# Patient Record
Sex: FEMALE | URBAN - METROPOLITAN AREA
[De-identification: names, ages, dates, MRNs, and addresses within clinical notes are randomized per-mention and may not be internally consistent; named-entity substitution may affect disease eponyms.]

---

## 2021-12-16 ENCOUNTER — LAB REQUISITION (OUTPATIENT)
Dept: LAB | Facility: CLINIC | Age: 81
End: 2021-12-16
Payer: COMMERCIAL

## 2021-12-16 DIAGNOSIS — A15.0 TUBERCULOSIS OF LUNG: ICD-10-CM

## 2021-12-17 PROCEDURE — 36415 COLL VENOUS BLD VENIPUNCTURE: CPT | Mod: ORL | Performed by: INTERNAL MEDICINE

## 2021-12-17 PROCEDURE — P9603 ONE-WAY ALLOW PRORATED MILES: HCPCS | Mod: ORL | Performed by: INTERNAL MEDICINE

## 2021-12-17 PROCEDURE — 86481 TB AG RESPONSE T-CELL SUSP: CPT | Mod: ORL | Performed by: INTERNAL MEDICINE

## 2021-12-18 LAB
GAMMA INTERFERON BACKGROUND BLD IA-ACNC: 0.06 IU/ML
M TB IFN-G BLD-IMP: NEGATIVE
M TB IFN-G CD4+ BCKGRND COR BLD-ACNC: 9.94 IU/ML
MITOGEN IGNF BCKGRD COR BLD-ACNC: 0.01 IU/ML
MITOGEN IGNF BCKGRD COR BLD-ACNC: 0.06 IU/ML
QUANTIFERON MITOGEN: 10 IU/ML
QUANTIFERON NIL TUBE: 0.06 IU/ML
QUANTIFERON TB1 TUBE: 0.12 IU/ML
QUANTIFERON TB2 TUBE: 0.07

## 2022-03-31 ENCOUNTER — LAB REQUISITION (OUTPATIENT)
Dept: LAB | Facility: CLINIC | Age: 82
End: 2022-03-31
Payer: COMMERCIAL

## 2022-03-31 DIAGNOSIS — R53.83 OTHER FATIGUE: ICD-10-CM

## 2022-04-01 ENCOUNTER — LAB REQUISITION (OUTPATIENT)
Dept: LAB | Facility: CLINIC | Age: 82
End: 2022-04-01
Payer: COMMERCIAL

## 2022-04-01 DIAGNOSIS — R53.83 OTHER FATIGUE: ICD-10-CM

## 2022-04-01 DIAGNOSIS — G20.A1 PARKINSON'S DISEASE (H): ICD-10-CM

## 2022-04-03 ENCOUNTER — LAB REQUISITION (OUTPATIENT)
Dept: LAB | Facility: CLINIC | Age: 82
End: 2022-04-03
Payer: COMMERCIAL

## 2022-04-03 DIAGNOSIS — G20.A1 PARKINSON'S DISEASE (H): ICD-10-CM

## 2022-04-04 LAB
ALBUMIN SERPL-MCNC: 4 G/DL (ref 3.5–5)
ALP SERPL-CCNC: 137 U/L (ref 45–120)
ALT SERPL W P-5'-P-CCNC: 9 U/L (ref 0–45)
ANION GAP SERPL CALCULATED.3IONS-SCNC: 10 MMOL/L (ref 5–18)
AST SERPL W P-5'-P-CCNC: 36 U/L (ref 0–40)
BILIRUB DIRECT SERPL-MCNC: 0.1 MG/DL
BILIRUB SERPL-MCNC: 0.4 MG/DL (ref 0–1)
BUN SERPL-MCNC: 23 MG/DL (ref 8–28)
CALCIUM SERPL-MCNC: 10.2 MG/DL (ref 8.5–10.5)
CHLORIDE BLD-SCNC: 102 MMOL/L (ref 98–107)
CO2 SERPL-SCNC: 30 MMOL/L (ref 22–31)
CREAT SERPL-MCNC: 0.81 MG/DL (ref 0.6–1.1)
ERYTHROCYTE [DISTWIDTH] IN BLOOD BY AUTOMATED COUNT: 14.5 % (ref 10–15)
GFR SERPL CREATININE-BSD FRML MDRD: 73 ML/MIN/1.73M2
GLUCOSE BLD-MCNC: 83 MG/DL (ref 70–125)
HCT VFR BLD AUTO: 40.3 % (ref 35–47)
HGB BLD-MCNC: 12.7 G/DL (ref 11.7–15.7)
MCH RBC QN AUTO: 29.4 PG (ref 26.5–33)
MCHC RBC AUTO-ENTMCNC: 31.5 G/DL (ref 31.5–36.5)
MCV RBC AUTO: 93 FL (ref 78–100)
PLATELET # BLD AUTO: 206 10E3/UL (ref 150–450)
POTASSIUM BLD-SCNC: 4.7 MMOL/L (ref 3.5–5)
PROT SERPL-MCNC: 7.4 G/DL (ref 6–8)
RBC # BLD AUTO: 4.32 10E6/UL (ref 3.8–5.2)
SODIUM SERPL-SCNC: 142 MMOL/L (ref 136–145)
WBC # BLD AUTO: 3.4 10E3/UL (ref 4–11)

## 2022-04-04 PROCEDURE — 80053 COMPREHEN METABOLIC PANEL: CPT | Mod: ORL | Performed by: INTERNAL MEDICINE

## 2022-04-04 PROCEDURE — 82248 BILIRUBIN DIRECT: CPT | Mod: ORL | Performed by: INTERNAL MEDICINE

## 2022-04-04 PROCEDURE — P9603 ONE-WAY ALLOW PRORATED MILES: HCPCS | Mod: ORL | Performed by: INTERNAL MEDICINE

## 2022-04-04 PROCEDURE — 85027 COMPLETE CBC AUTOMATED: CPT | Mod: ORL | Performed by: INTERNAL MEDICINE

## 2022-04-04 PROCEDURE — 36415 COLL VENOUS BLD VENIPUNCTURE: CPT | Mod: ORL | Performed by: INTERNAL MEDICINE

## 2022-12-07 ENCOUNTER — MEDICAL CORRESPONDENCE (OUTPATIENT)
Dept: HEALTH INFORMATION MANAGEMENT | Facility: CLINIC | Age: 82
End: 2022-12-07

## 2022-12-07 ENCOUNTER — TRANSFERRED RECORDS (OUTPATIENT)
Dept: HEALTH INFORMATION MANAGEMENT | Facility: CLINIC | Age: 82
End: 2022-12-07

## 2022-12-08 ENCOUNTER — LAB REQUISITION (OUTPATIENT)
Dept: LAB | Facility: CLINIC | Age: 82
End: 2022-12-08
Payer: COMMERCIAL

## 2022-12-08 DIAGNOSIS — E03.9 HYPOTHYROIDISM, UNSPECIFIED: ICD-10-CM

## 2022-12-09 LAB
ANION GAP SERPL CALCULATED.3IONS-SCNC: 11 MMOL/L (ref 7–15)
BUN SERPL-MCNC: 18.2 MG/DL (ref 8–23)
CALCIUM SERPL-MCNC: 9.2 MG/DL (ref 8.8–10.2)
CHLORIDE SERPL-SCNC: 105 MMOL/L (ref 98–107)
CREAT SERPL-MCNC: 0.9 MG/DL (ref 0.51–0.95)
DEPRECATED HCO3 PLAS-SCNC: 27 MMOL/L (ref 22–29)
GFR SERPL CREATININE-BSD FRML MDRD: 64 ML/MIN/1.73M2
GLUCOSE SERPL-MCNC: 84 MG/DL (ref 70–99)
POTASSIUM SERPL-SCNC: 4.1 MMOL/L (ref 3.4–5.3)
SODIUM SERPL-SCNC: 143 MMOL/L (ref 136–145)
TSH SERPL DL<=0.005 MIU/L-ACNC: 7.04 UIU/ML (ref 0.3–4.2)

## 2022-12-09 PROCEDURE — 80048 BASIC METABOLIC PNL TOTAL CA: CPT | Mod: ORL | Performed by: INTERNAL MEDICINE

## 2022-12-09 PROCEDURE — 84443 ASSAY THYROID STIM HORMONE: CPT | Mod: ORL | Performed by: INTERNAL MEDICINE

## 2022-12-09 PROCEDURE — P9603 ONE-WAY ALLOW PRORATED MILES: HCPCS | Mod: ORL | Performed by: INTERNAL MEDICINE

## 2022-12-09 PROCEDURE — 36415 COLL VENOUS BLD VENIPUNCTURE: CPT | Mod: ORL | Performed by: INTERNAL MEDICINE

## 2023-01-19 ENCOUNTER — OFFICE VISIT (OUTPATIENT)
Dept: FAMILY MEDICINE | Facility: OTHER | Age: 83
End: 2023-01-19
Payer: COMMERCIAL

## 2023-01-19 VITALS
TEMPERATURE: 97.1 F | SYSTOLIC BLOOD PRESSURE: 122 MMHG | OXYGEN SATURATION: 100 % | HEART RATE: 57 BPM | HEIGHT: 66 IN | DIASTOLIC BLOOD PRESSURE: 78 MMHG

## 2023-01-19 DIAGNOSIS — F32.A ANXIETY AND DEPRESSION: ICD-10-CM

## 2023-01-19 DIAGNOSIS — G62.9 PERIPHERAL POLYNEUROPATHY: ICD-10-CM

## 2023-01-19 DIAGNOSIS — Z00.00 MEDICARE ANNUAL WELLNESS VISIT, SUBSEQUENT: Primary | ICD-10-CM

## 2023-01-19 DIAGNOSIS — H35.3290 EXUDATIVE AGE-RELATED MACULAR DEGENERATION, UNSPECIFIED LATERALITY, UNSPECIFIED STAGE (H): ICD-10-CM

## 2023-01-19 DIAGNOSIS — K21.9 GASTROESOPHAGEAL REFLUX DISEASE, UNSPECIFIED WHETHER ESOPHAGITIS PRESENT: ICD-10-CM

## 2023-01-19 DIAGNOSIS — D64.9 MILD CHRONIC ANEMIA: ICD-10-CM

## 2023-01-19 DIAGNOSIS — E03.9 HYPOTHYROIDISM, UNSPECIFIED TYPE: ICD-10-CM

## 2023-01-19 DIAGNOSIS — R73.01 FASTING HYPERGLYCEMIA: ICD-10-CM

## 2023-01-19 DIAGNOSIS — G20.A1 PARKINSON'S DISEASE (H): ICD-10-CM

## 2023-01-19 DIAGNOSIS — F41.9 ANXIETY AND DEPRESSION: ICD-10-CM

## 2023-01-19 DIAGNOSIS — M51.369 DDD (DEGENERATIVE DISC DISEASE), LUMBAR: ICD-10-CM

## 2023-01-19 DIAGNOSIS — H52.00 HYPERMETROPIA, UNSPECIFIED LATERALITY: ICD-10-CM

## 2023-01-19 DIAGNOSIS — K58.9 IRRITABLE BOWEL SYNDROME, UNSPECIFIED TYPE: ICD-10-CM

## 2023-01-19 DIAGNOSIS — R01.1 HEART MURMUR: ICD-10-CM

## 2023-01-19 DIAGNOSIS — E78.2 MIXED HYPERLIPIDEMIA: ICD-10-CM

## 2023-01-19 DIAGNOSIS — H26.491 RIGHT POSTERIOR CAPSULAR OPACIFICATION: ICD-10-CM

## 2023-01-19 PROBLEM — R51.9 HEADACHE: Status: ACTIVE | Noted: 2018-08-15

## 2023-01-19 PROBLEM — H53.2 DIPLOPIA: Status: ACTIVE | Noted: 2021-10-19

## 2023-01-19 LAB
ANION GAP SERPL CALCULATED.3IONS-SCNC: 9 MMOL/L (ref 7–15)
BUN SERPL-MCNC: 23.4 MG/DL (ref 8–23)
CALCIUM SERPL-MCNC: 9.9 MG/DL (ref 8.8–10.2)
CHLORIDE SERPL-SCNC: 103 MMOL/L (ref 98–107)
CREAT SERPL-MCNC: 0.84 MG/DL (ref 0.51–0.95)
DEPRECATED HCO3 PLAS-SCNC: 30 MMOL/L (ref 22–29)
ERYTHROCYTE [DISTWIDTH] IN BLOOD BY AUTOMATED COUNT: 16 % (ref 10–15)
GFR SERPL CREATININE-BSD FRML MDRD: 69 ML/MIN/1.73M2
GLUCOSE SERPL-MCNC: 91 MG/DL (ref 70–99)
HCT VFR BLD AUTO: 34.4 % (ref 35–47)
HGB BLD-MCNC: 10.9 G/DL (ref 11.7–15.7)
MCH RBC QN AUTO: 29.4 PG (ref 26.5–33)
MCHC RBC AUTO-ENTMCNC: 31.7 G/DL (ref 31.5–36.5)
MCV RBC AUTO: 93 FL (ref 78–100)
PLATELET # BLD AUTO: 193 10E3/UL (ref 150–450)
POTASSIUM SERPL-SCNC: 4 MMOL/L (ref 3.4–5.3)
RBC # BLD AUTO: 3.71 10E6/UL (ref 3.8–5.2)
SODIUM SERPL-SCNC: 142 MMOL/L (ref 136–145)
TSH SERPL DL<=0.005 MIU/L-ACNC: 0.91 UIU/ML (ref 0.3–4.2)
WBC # BLD AUTO: 6.9 10E3/UL (ref 4–11)

## 2023-01-19 PROCEDURE — 84443 ASSAY THYROID STIM HORMONE: CPT | Performed by: PHYSICIAN ASSISTANT

## 2023-01-19 PROCEDURE — 85027 COMPLETE CBC AUTOMATED: CPT | Performed by: PHYSICIAN ASSISTANT

## 2023-01-19 PROCEDURE — 36415 COLL VENOUS BLD VENIPUNCTURE: CPT | Performed by: PHYSICIAN ASSISTANT

## 2023-01-19 PROCEDURE — 99214 OFFICE O/P EST MOD 30 MIN: CPT | Mod: 25 | Performed by: PHYSICIAN ASSISTANT

## 2023-01-19 PROCEDURE — G0438 PPPS, INITIAL VISIT: HCPCS | Performed by: PHYSICIAN ASSISTANT

## 2023-01-19 PROCEDURE — 80048 BASIC METABOLIC PNL TOTAL CA: CPT | Performed by: PHYSICIAN ASSISTANT

## 2023-01-19 RX ORDER — CARBOXYMETHYLCELLULOSE SODIUM 5 MG/ML
1 SOLUTION/ DROPS OPHTHALMIC 3 TIMES DAILY PRN
Qty: 30 ML | Refills: 1 | COMMUNITY
Start: 2023-01-19

## 2023-01-19 RX ORDER — VITAMIN B COMPLEX
1 TABLET ORAL DAILY
COMMUNITY
Start: 2023-01-19

## 2023-01-19 RX ORDER — MEMANTINE HYDROCHLORIDE 10 MG/1
10 TABLET ORAL 2 TIMES DAILY
Qty: 60 TABLET | Refills: 2 | Status: SHIPPED | OUTPATIENT
Start: 2023-01-19

## 2023-01-19 RX ORDER — ANTIOX #8/OM3/DHA/EPA/LUT/ZEAX 250-2.5 MG
1 CAPSULE ORAL 2 TIMES DAILY
COMMUNITY
Start: 2023-01-19

## 2023-01-19 RX ORDER — CARBIDOPA AND LEVODOPA 50; 200 MG/1; MG/1
1 TABLET, EXTENDED RELEASE ORAL AT BEDTIME
Qty: 30 TABLET | Refills: 2 | Status: SHIPPED | OUTPATIENT
Start: 2023-01-19 | End: 2023-01-19

## 2023-01-19 RX ORDER — CHOLECALCIFEROL (VITAMIN D3) 125 MCG
3000 CAPSULE ORAL
COMMUNITY
Start: 2023-01-19

## 2023-01-19 RX ORDER — PANTOPRAZOLE SODIUM 40 MG/1
40 TABLET, DELAYED RELEASE ORAL DAILY
Qty: 30 TABLET | Refills: 2 | Status: SHIPPED | OUTPATIENT
Start: 2023-01-19

## 2023-01-19 RX ORDER — SIMVASTATIN 20 MG
20 TABLET ORAL AT BEDTIME
Qty: 30 TABLET | Refills: 2 | Status: SHIPPED | OUTPATIENT
Start: 2023-01-19

## 2023-01-19 RX ORDER — GABAPENTIN 100 MG/1
CAPSULE ORAL
COMMUNITY
Start: 2021-04-28 | End: 2023-01-19

## 2023-01-19 RX ORDER — CARBIDOPA AND LEVODOPA 25; 100 MG/1; MG/1
1 TABLET ORAL 3 TIMES DAILY
COMMUNITY
Start: 2021-03-24 | End: 2023-01-19

## 2023-01-19 RX ORDER — CARBIDOPA AND LEVODOPA 25; 100 MG/1; MG/1
1 TABLET ORAL 3 TIMES DAILY
Qty: 90 TABLET | Refills: 2 | Status: SHIPPED | OUTPATIENT
Start: 2023-01-19 | End: 2023-01-19

## 2023-01-19 RX ORDER — ACETAMINOPHEN 500 MG
500-1000 TABLET ORAL EVERY 6 HOURS PRN
COMMUNITY
Start: 2023-01-19

## 2023-01-19 RX ORDER — POLYETHYLENE GLYCOL 3350 17 G/17G
17 POWDER, FOR SOLUTION ORAL DAILY
Qty: 510 G | Refills: 1 | COMMUNITY
Start: 2023-01-19

## 2023-01-19 RX ORDER — GABAPENTIN 100 MG/1
CAPSULE ORAL
Qty: 210 CAPSULE | Refills: 2 | Status: SHIPPED | OUTPATIENT
Start: 2023-01-19 | End: 2023-01-19

## 2023-01-19 RX ORDER — CHLORHEXIDINE GLUCONATE ORAL RINSE 1.2 MG/ML
15 SOLUTION DENTAL 2 TIMES DAILY
Qty: 473 ML | Refills: 2 | Status: SHIPPED | OUTPATIENT
Start: 2023-01-19

## 2023-01-19 RX ORDER — CARBIDOPA AND LEVODOPA 50; 200 MG/1; MG/1
2 TABLET, EXTENDED RELEASE ORAL AT BEDTIME
Qty: 60 TABLET | Refills: 2 | Status: SHIPPED | OUTPATIENT
Start: 2023-01-19

## 2023-01-19 RX ORDER — LEVOTHYROXINE SODIUM 112 UG/1
112 TABLET ORAL DAILY
COMMUNITY
Start: 2023-01-19

## 2023-01-19 RX ORDER — DONEPEZIL HYDROCHLORIDE 10 MG/1
10 TABLET, FILM COATED ORAL AT BEDTIME
Qty: 30 TABLET | Refills: 2 | Status: SHIPPED | OUTPATIENT
Start: 2023-01-19

## 2023-01-19 RX ORDER — CLONAZEPAM 0.5 MG/1
0.5 TABLET ORAL AT BEDTIME
Qty: 30 TABLET | Refills: 1 | Status: SHIPPED | OUTPATIENT
Start: 2023-01-19

## 2023-01-19 RX ORDER — CARBIDOPA AND LEVODOPA 25; 100 MG/1; MG/1
2 TABLET ORAL 3 TIMES DAILY
Qty: 180 TABLET | Refills: 2 | Status: SHIPPED | OUTPATIENT
Start: 2023-01-19

## 2023-01-19 RX ORDER — GABAPENTIN 100 MG/1
CAPSULE ORAL
Qty: 105 CAPSULE | Refills: 0 | Status: SHIPPED | OUTPATIENT
Start: 2023-01-19

## 2023-01-19 ASSESSMENT — ACTIVITIES OF DAILY LIVING (ADL)
CURRENT_FUNCTION: MEDICATION ADMINISTRATION REQUIRES ASSISTANCE
CURRENT_FUNCTION: MONEY MANAGEMENT REQUIRES ASSISTANCE
CURRENT_FUNCTION: SHOPPING REQUIRES ASSISTANCE
CURRENT_FUNCTION: HOUSEWORK REQUIRES ASSISTANCE
CURRENT_FUNCTION: BATHING REQUIRES ASSISTANCE
CURRENT_FUNCTION: LAUNDRY REQUIRES ASSISTANCE
CURRENT_FUNCTION: TELEPHONE REQUIRES ASSISTANCE
CURRENT_FUNCTION: TRANSPORTATION REQUIRES ASSISTANCE
CURRENT_FUNCTION: PREPARING MEALS REQUIRES ASSISTANCE

## 2023-01-19 NOTE — LETTER
January 20, 2023      Joselyn Sahu  [address] [facility] [address]            Dear ,    We are writing to inform you of your test results.    Your hemoglobin is slightly lower than normal at 10.9 but likely stable given your chronic anemia.     Your kidney function, glucose, electrolytes and thyroid tests are all normal. Thanks!        Resulted Orders   CBC with platelets   Result Value Ref Range    WBC Count 6.9 4.0 - 11.0 10e3/uL    RBC Count 3.71 (L) 3.80 - 5.20 10e6/uL    Hemoglobin 10.9 (L) 11.7 - 15.7 g/dL    Hematocrit 34.4 (L) 35.0 - 47.0 %    MCV 93 78 - 100 fL    MCH 29.4 26.5 - 33.0 pg    MCHC 31.7 31.5 - 36.5 g/dL    RDW 16.0 (H) 10.0 - 15.0 %    Platelet Count 193 150 - 450 10e3/uL   TSH with free T4 reflex   Result Value Ref Range    TSH 0.91 0.30 - 4.20 uIU/mL   Basic metabolic panel  (Ca, Cl, CO2, Creat, Gluc, K, Na, BUN)   Result Value Ref Range    Sodium 142 136 - 145 mmol/L    Potassium 4.0 3.4 - 5.3 mmol/L    Chloride 103 98 - 107 mmol/L    Carbon Dioxide (CO2) 30 (H) 22 - 29 mmol/L    Anion Gap 9 7 - 15 mmol/L    Urea Nitrogen 23.4 (H) 8.0 - 23.0 mg/dL    Creatinine 0.84 0.51 - 0.95 mg/dL    Calcium 9.9 8.8 - 10.2 mg/dL    Glucose 91 70 - 99 mg/dL    GFR Estimate 69 >60 mL/min/1.73m2      Comment:      Effective December 21, 2021 eGFRcr in adults is calculated using the 2021 CKD-EPI creatinine equation which includes age and gender (Eb et al., NEJM, DOI: 10.1056/YHQWux5140658)       If you have any questions or concerns, please call the clinic at the number listed above.       Sincerely,      Kash Hoyt PA-C

## 2023-01-19 NOTE — PROGRESS NOTES
SUBJECTIVE:   Joselyn is a 82 year old who presents for Preventive Visit.  Patient has been advised of split billing requirements and indicates understanding: Yes  Are you in the first 12 months of your Medicare coverage?  No    Healthy Habits:     In general, how would you rate your overall health?  Fair    Frequency of exercise:  None    Duration of exercise:  Less than 15 minutes    Taking medications regularly:  1    Barriers to taking medications:  Side effects    Ability to successfully perform activities of daily living:  Housework requires assistance, bathing requires assistance, telephone requires assistance, transportation requires assistance, medication administration requires assistance, preparing meals requires assistance, shopping requires assistance, laundry requires assistance and money management requires assistance    Home Safety:  No safety concerns identified    Hearing Impairment:  No hearing concerns    In the past 6 months, have you been bothered by leaking of urine?  No    In general, how would you rate your overall mental or emotional health?  Good      PHQ-2 Total Score: 2    Additional concerns today:  Yes  History of Present Illness       Reason for visit:  Establish     She eats 2-3 servings of fruits and vegetables daily.She consumes 1 sweetened beverage(s) daily.She exercises with enough effort to increase her heart rate 9 or less minutes per day.  She exercises with enough effort to increase her heart rate 3 or less days per week. She is missing 1 dose(s) of medications per week.  She is not taking prescribed medications regularly due to side effects.    She has been at [facility] for the past few years but unfortunately, has not been receiving adequate care so she is now living with her daughter for the time being and is planning to transition back to a long-term care facility in [place] where she has been previously as she has a lot of family in this area. In the  meantime, she needs a primary care provider to fill her medications. Both of her daughters would like to wean her off gabapentin as it is causing significantly increased sedation during the day and is not really providing any positive effects. She remains mostly wheelchair bound but her daughter tries to get her up on her feet as much as possible with close supervision/assistance. She does have chronic low back pain and takes Tylenol as needed. She also has chronic opthalmologic issues and is on a few different daily eye drops. She would like to wait to see ophthalmology until she is back in [place].      Have you ever done Advance Care Planning? (For example, a Health Directive, POLST, or a discussion with a medical provider or your loved ones about your wishes): Yes, advance care planning is on file.       Fall risk  Fallen 2 or more times in the past year?: Yes  Any fall with injury in the past year?: No    Cognitive Screening   Not appropriate due to Parkinson's disease    Mini-CogTM Copyright BERLIN Madden. Licensed by the author for use in [facility]; reprinted with permission (zandra@.[facility]). All rights reserved.      Do you have sleep apnea, excessive snoring or daytime drowsiness?: yes    Reviewed and updated as needed this visit by clinical staff   Tobacco  Allergies  Meds  Problems  Med Hx  Surg Hx  Fam Hx          Reviewed and updated as needed this visit by Provider   Tobacco  Allergies  Meds  Problems  Med Hx  Surg Hx  Fam Hx         Social History     Tobacco Use     Smoking status: Never     Smokeless tobacco: Never   Substance Use Topics     Alcohol use: Not on file     If you drink alcohol do you typically have >3 drinks per day or >7 drinks per week? Not applicable    Alcohol Use 1/19/2023   Prescreen: >3 drinks/day or >7 drinks/week? Not Applicable       Current providers sharing in care for this patient include:   Patient Care Team:  Kash Hoyt PA-C as PCP -  "General (Family Medicine)    The following health maintenance items are reviewed in Epic and correct as of today:  Health Maintenance   Topic Date Due     DEXA  Never done     ANNUAL REVIEW OF HM ORDERS  Never done     DTAP/TDAP/TD IMMUNIZATION (1 - Tdap) Never done     MEDICARE ANNUAL WELLNESS VISIT  Never done     ZOSTER IMMUNIZATION (2 of 3) 05/09/2014     FALL RISK ASSESSMENT  01/19/2024     ADVANCE CARE PLANNING  01/19/2028     PHQ-2 (once per calendar year)  Completed     INFLUENZA VACCINE  Completed     Pneumococcal Vaccine: 65+ Years  Completed     COVID-19 Vaccine  Completed     IPV IMMUNIZATION  Aged Out     MENINGITIS IMMUNIZATION  Aged Out       Pertinent mammograms are reviewed under the imaging tab.    Review of Systems  Constitutional, HEENT, cardiovascular, pulmonary, GI, , musculoskeletal, neuro, skin, endocrine and psych systems are negative, except as otherwise noted.    OBJECTIVE:   /78 (BP Location: Right arm, Patient Position: Sitting, Cuff Size: Adult Regular)   Pulse 57   Temp 97.1  F (36.2  C) (Temporal)   Ht 1.676 m (5' 6\")   SpO2 100%  There is no height or weight on file to calculate BMI.  Physical Exam  GENERAL: healthy, alert and no distress, sitting in wheelchair throughout the visit  EYES: Eyes grossly normal to inspection, PERRL and conjunctivae and sclerae normal  NECK: no adenopathy, no asymmetry, masses, or scars and thyroid normal to palpation  RESP: lungs clear to auscultation - no rales, rhonchi or wheezes  CV: regular rate and rhythm, normal S1 S2, no S3 or S4, 1-2/6 systolic murmur with possible click, no rub, trace peripheral edema   MS: no gross musculoskeletal defects noted, no edema  SKIN: no suspicious lesions or rashes  NEURO: Normal strength and tone. Masked facies appreciated with soft, monotone speech. Gait not tested.  PSYCH: mentation appears normal, affect normal/bright    Results for orders placed or performed in visit on 01/19/23   CBC with platelets "     Status: Abnormal   Result Value Ref Range    WBC Count 6.9 4.0 - 11.0 10e3/uL    RBC Count 3.71 (L) 3.80 - 5.20 10e6/uL    Hemoglobin 10.9 (L) 11.7 - 15.7 g/dL    Hematocrit 34.4 (L) 35.0 - 47.0 %    MCV 93 78 - 100 fL    MCH 29.4 26.5 - 33.0 pg    MCHC 31.7 31.5 - 36.5 g/dL    RDW 16.0 (H) 10.0 - 15.0 %    Platelet Count 193 150 - 450 10e3/uL         ASSESSMENT / PLAN:       ICD-10-CM    1. Medicare annual wellness visit, subsequent  Z00.00       2. Parkinson's disease (H)  G20 chlorhexidine (PERIDEX) 0.12 % solution     clonazePAM (KLONOPIN) 0.5 MG tablet     donepezil (ARICEPT) 10 MG tablet     memantine (NAMENDA) 10 MG tablet     carbidopa-levodopa (SINEMET CR)  MG CR tablet     carbidopa-levodopa (SINEMET)  MG tablet     DISCONTINUED: carbidopa-levodopa (SINEMET)  MG tablet     DISCONTINUED: carbidopa-levodopa (SINEMET CR)  MG CR tablet      3. Peripheral polyneuropathy  G62.9 gabapentin (NEURONTIN) 100 MG capsule     DISCONTINUED: gabapentin (NEURONTIN) 100 MG capsule      4. DDD (degenerative disc disease), lumbar  M51.36 gabapentin (NEURONTIN) 100 MG capsule     DISCONTINUED: gabapentin (NEURONTIN) 100 MG capsule      5. Hypothyroidism, unspecified type  E03.9 TSH with free T4 reflex     TSH with free T4 reflex      6. Anxiety and depression  F41.9 clonazePAM (KLONOPIN) 0.5 MG tablet    F32.A       7. Mild chronic anemia  D64.9 CBC with platelets     CBC with platelets      8. Irritable bowel syndrome, unspecified type  K58.9       9. Mixed hyperlipidemia  E78.2 simvastatin (ZOCOR) 20 MG tablet      10. Fasting hyperglycemia  R73.01 Basic metabolic panel  (Ca, Cl, CO2, Creat, Gluc, K, Na, BUN)     Basic metabolic panel  (Ca, Cl, CO2, Creat, Gluc, K, Na, BUN)      11. Gastroesophageal reflux disease, unspecified whether esophagitis present  K21.9 pantoprazole (PROTONIX) 40 MG EC tablet      12. Heart murmur  R01.1 Echocardiogram Complete      13. Hypermetropia, unspecified  laterality  H52.00       14. Exudative age-related macular degeneration, unspecified laterality, unspecified stage (H)  H35.3290       15. Right posterior capsular opacification  H26.491           1. Medicare wellness visit completed.    2. She remains on carbidopa-levodopa, Aricept and Namenda and her daughter works to keep her body moving as much as able. Continue current medications and re-establish care with neurology when she gets back to [place]. Will refill medications    3-4. Daughters state that this gabapentin causes way too much sedation and is not providing any noticeable benefit. Will wean down by 1 capsule weekly until she is off of it. It sounds like daughter has already weaned down to 1 capsule 3 times daily over the past few weeks. If she has any new symptoms off the medication or increased pain, contact the clinic. Continue Tylenol as needed for pain.    5. Updated thyroid testing ordered.    6. Continue nightly Klonopin for anxiety and sleep.    7. Hemoglobin stable at 10.9 indicating mild anemia. Will continue to monitor.    8. Not much of an issue lately. Uses Miralax as needed    9. Continue simvastatin.    10. Non-fasting BMP ordered.    11. Continue Protonix.    12. Heart murmur noted on exam today which is not appreciated in her clinic notes previously with no noted echo on file. I recommend an echocardiogram for further evaluation.     13-15. Continue with lubricating eye drops and she will re-establish care with ophthalmology when she is back in [place].    Follow-up as needed.    A total of 60 minutes spent on reviewing history, completing exam, discussing plan and completing note.     Patient has been advised of split billing requirements and indicates understanding: Yes      COUNSELING:  Reviewed preventive health counseling, as reflected in patient instructions       Healthy diet/nutrition       Vision screening        She reports that she has never smoked. She has never used  smokeless tobacco.      Appropriate preventive services were discussed with this patient, including applicable screening as appropriate for cardiovascular disease, diabetes, osteopenia/osteoporosis, and glaucoma.  As appropriate for age/gender, discussed screening for colorectal cancer, prostate cancer, breast cancer, and cervical cancer. Checklist reviewing preventive services available has been given to the patient.    Reviewed patients plan of care and provided an AVS. The Basic Care Plan (routine screening as documented in Health Maintenance) for Joselyn meets the Care Plan requirement. This Care Plan has been established and reviewed with the Patient and daughter.    Kash Hoyt PA-C  [facility]    Identified Health Risks:

## 2023-01-19 NOTE — PATIENT INSTRUCTIONS
Will increase the evening dose of gabapentin to 3 capsules.    Continue current doses of carbidopa-levodopa.    Will order an echocardiogram given the heart murmur.

## 2023-01-20 ENCOUNTER — TELEPHONE (OUTPATIENT)
Dept: FAMILY MEDICINE | Facility: OTHER | Age: 83
End: 2023-01-20
Payer: COMMERCIAL

## 2023-01-20 NOTE — TELEPHONE ENCOUNTER
I have never had to place an order through [facility] for a skilled nursing facility but I have placed an order and this can be faxed. I also completed a letter so this can be used if needed. Thanks!    Kash Hoyt PA-C

## 2023-01-20 NOTE — TELEPHONE ENCOUNTER
Patients daughter calling in needing a referral to be faxed over to [facility], [facility] at [phone].  Daughter asked if it could include patients med list, order to be admitted, pt/ot/st, history and physical, medicare card copy and ma card copy.      If questions please call Bella at  [phone].  Daughter hoping it could be done asap so patient can move in on Tuesday 1/24.

## 2023-01-20 NOTE — LETTER
[facility]  [address]  [place] [address]  Phone: [phone]    January 20, 2023        Joselyn Sahu  [address]  [place] [address]          To whom it may concern:    RE: Joselyn Sahu    Please admit Joselyn Sahu to [facility], [facility] for long-term care. Thank you.    Please contact me for questions or concerns.      Sincerely,        Kash Hoyt PA-C

## 2023-01-20 NOTE — TELEPHONE ENCOUNTER
Nikolas-I'm not seen a referral here.  All is good with her insurance can you place and send back to me and I can fax?  I will be here until 4:00.  Thank you!  Charisse ASENCIO [facility] Referrals Coordinator

## 2023-01-20 NOTE — TELEPHONE ENCOUNTER
April from [facility] calling in requesting med list, progress notes, face sheet and history & physical to be faxed over.  Faxed to [phone].  Confirmed delivery via RightFax.

## 2024-04-19 ENCOUNTER — HOSPITAL ENCOUNTER (EMERGENCY)
Dept: HOSPITAL 11 - JP.ED | Age: 84
Discharge: NURSING, REHAB OR LONG-TERM CARE | End: 2024-04-19
Payer: MEDICARE

## 2024-04-19 DIAGNOSIS — Z79.899: ICD-10-CM

## 2024-04-19 DIAGNOSIS — I10: Primary | ICD-10-CM

## 2024-04-19 DIAGNOSIS — Z91.018: ICD-10-CM

## 2024-04-19 DIAGNOSIS — Z88.8: ICD-10-CM

## 2024-04-19 DIAGNOSIS — Z91.041: ICD-10-CM

## 2024-04-19 DIAGNOSIS — Z88.6: ICD-10-CM

## 2024-04-19 LAB
ANION GAP SERPL CALC-SCNC: 12.7 MMOL/L (ref 5–14)
BASOPHILS # BLD AUTO: 0.01 K/UL (ref 0–0.1)
BASOPHILS NFR BLD AUTO: 0.2 % (ref 0.1–1.3)
BUN SERPL-MCNC: 24 MG/DL (ref 7–18)
CALCIUM SERPL-MCNC: 9.3 MG/DL (ref 8.5–10.1)
CHLORIDE SERPL-SCNC: 102 MMOL/L (ref 100–108)
CO2 SERPL-SCNC: 29 MMOL/L (ref 21–32)
CREAT CL 24H UR+SERPL-VRATE: 39.9 ML/MIN
CREAT SERPL-MCNC: 1 MG/DL (ref 0.6–1)
EOSINOPHIL # BLD AUTO: 0.13 K/UL (ref 0–0.4)
EOSINOPHIL NFR BLD AUTO: 2.6 % (ref 0–5.4)
GLUCOSE SERPL-MCNC: 111 MG/DL (ref 74–106)
HCT VFR BLD AUTO: 35.1 % (ref 34.3–46)
HGB BLD-MCNC: 11.2 G/DL (ref 11.2–15.5)
IMM GRANULOCYTES # BLD: 0.01 K/UL (ref 0–0.23)
IMM GRANULOCYTES NFR BLD: 0.2 % (ref 0–0.7)
LYMPHOCYTES # BLD AUTO: 1.92 K/UL (ref 0.8–3.3)
LYMPHOCYTES NFR BLD AUTO: 39.1 % (ref 11.4–47.7)
MCH RBC QN AUTO: 29.2 PG (ref 31.6–35.5)
MCHC RBC AUTO-ENTMCNC: 31.9 G/DL (ref 31.6–35.5)
MCHC RBC AUTO-ENTMCNC: 91.4 FL (ref 81.4–99)
MONOCYTES # BLD AUTO: 0.49 K/UL (ref 0.2–0.9)
MONOCYTES NFR BLD AUTO: 10 % (ref 3.3–12.6)
NEUTROPHILS # BLD AUTO: 2.35 K/UL (ref 1–7.6)
NEUTROPHILS NFR BLD AUTO: 47.9 % (ref 40–78.1)
PLATELET # BLD AUTO: 268 K/UL (ref 130–375)
POTASSIUM SERPL-SCNC: 4.7 MMOL/L (ref 3.6–5.2)
RBC # BLD AUTO: 3.84 M/UL (ref 3.77–5.24)
SODIUM SERPL-SCNC: 139 MMOL/L (ref 140–148)
WBC # BLD AUTO: 4.9 K/UL (ref 3.2–11)

## 2024-10-02 ENCOUNTER — TELEPHONE (OUTPATIENT)
Dept: FAMILY MEDICINE | Facility: OTHER | Age: 84
End: 2024-10-02
Payer: COMMERCIAL

## 2024-10-02 NOTE — LETTER
October 2, 2024      Joselynradha Sahu  [address]  [place] [facility] [address]        Dear Joselyn,         Our records indicate that you are due for a Annual Wellness Check with your Primary Care Provider.     Our records also indicate that you are due for some immunizations, labs and/or preventative screenings.     Please schedule an appointment via Arteris or by calling the clinic at [phone], and asking to speak to a member of your care team.        To Schedule an Appointment via Arteris -     1) Click the Visits tab (located on the top left) and select the option to Schedule an Appointment  2) Choose the department you would like to schedule in and respond to any additional questions that populate.  (Note: There are also options for you to indicate preferred clinic locations and providers)  3) Verify your Personal Information by clicking This Information Is Correct or Edit if the information is not correct  4) Review the insurance information on file and if current, select This Information Is Correct   If the information is not correct, you can select options to remove or update your coverage.  5) Where prompted, please specify the reason for your appointment and select Schedule    After the appointment has been scheduled, you will see a confirmation with all the details for your upcoming visit.      Take Care,  Your [place] Patient Care Team    [facility]  Phone: [phone]  Fax: [phone]

## 2024-10-02 NOTE — TELEPHONE ENCOUNTER
Patient Quality Outreach    Patient is due for the following:   Physical Annual Wellness Visit    Next Steps:   Schedule a Annual Wellness Visit    Type of outreach:    Sent letter.    Next Steps:  Reach out within 90 days via Phone.    Max number of attempts reached: Yes. Will try again in 90 days if patient still on fail list.    Questions for provider review:    None           Sadia Betancourt, CMA

## 2025-01-22 ENCOUNTER — TELEPHONE (OUTPATIENT)
Dept: FAMILY MEDICINE | Facility: OTHER | Age: 85
End: 2025-01-22
Payer: COMMERCIAL

## 2025-01-22 NOTE — LETTER
January 22, 2025      Joselynradha Sahu  [address]  [place] [facility] [address]        Dear Joselyn,       Our records indicate that you are due for a Annual Wellness Check with your Primary Care Provider.     Our records also indicate that you are due for some immunizations, labs and/or preventative screenings.     Please schedule an appointment via Smart Cube or by calling the clinic at [phone], and asking to speak to a member of your care team.        To Schedule an Appointment via Smart Cube -     1) Click the Visits tab (located on the top left) and select the option to Schedule an Appointment  2) Choose the department you would like to schedule in and respond to any additional questions that populate.  (Note: There are also options for you to indicate preferred clinic locations and providers)  3) Verify your Personal Information by clicking This Information Is Correct or Edit if the information is not correct  4) Review the insurance information on file and if current, select This Information Is Correct   If the information is not correct, you can select options to remove or update your coverage.  5) Where prompted, please specify the reason for your appointment and select Schedule    After the appointment has been scheduled, you will see a confirmation with all the details for your upcoming visit.      Take Care,  Your [place] Patient Care Team    [facility]  Phone: [phone]  Fax: [phone]

## 2025-01-22 NOTE — TELEPHONE ENCOUNTER
Patient Quality Outreach    Patient is due for the following:   Physical Annual Wellness Visit      Topic Date Due    Diptheria Tetanus Pertussis (DTAP/TDAP/TD) Vaccine (1 - Tdap) Never done    Zoster (Shingles) Vaccine (2 of 3) 05/09/2014    Flu Vaccine (1) 09/01/2024    COVID-19 Vaccine (6 - 2024-25 season) 09/01/2024       Action(s) Taken:   Schedule a Annual Wellness Visit    Type of outreach:    Letter    Questions for provider review:    None           Sadia Betancourt CMA  Chart routed to Care Team.